# Patient Record
Sex: MALE | Race: WHITE | NOT HISPANIC OR LATINO | Employment: STUDENT | URBAN - METROPOLITAN AREA
[De-identification: names, ages, dates, MRNs, and addresses within clinical notes are randomized per-mention and may not be internally consistent; named-entity substitution may affect disease eponyms.]

---

## 2018-09-10 ENCOUNTER — OFFICE VISIT (OUTPATIENT)
Dept: CARDIOLOGY | Facility: CLINIC | Age: 28
End: 2018-09-10
Payer: COMMERCIAL

## 2018-09-10 VITALS
BODY MASS INDEX: 38.82 KG/M2 | SYSTOLIC BLOOD PRESSURE: 118 MMHG | HEIGHT: 74 IN | DIASTOLIC BLOOD PRESSURE: 80 MMHG | HEART RATE: 70 BPM | WEIGHT: 302.5 LBS

## 2018-09-10 DIAGNOSIS — R00.2 HEART PALPITATIONS: Primary | ICD-10-CM

## 2018-09-10 DIAGNOSIS — F31.60 BIPOLAR AFFECTIVE DISORDER, MIXED: ICD-10-CM

## 2018-09-10 DIAGNOSIS — F06.4 ANXIETY DISORDER DUE TO GENERAL MEDICAL CONDITION: ICD-10-CM

## 2018-09-10 PROCEDURE — 99204 OFFICE O/P NEW MOD 45 MIN: CPT | Mod: S$GLB,,, | Performed by: INTERNAL MEDICINE

## 2018-09-10 PROCEDURE — 99999 PR PBB SHADOW E&M-NEW PATIENT-LVL III: CPT | Mod: PBBFAC,,, | Performed by: INTERNAL MEDICINE

## 2018-09-10 PROCEDURE — 3008F BODY MASS INDEX DOCD: CPT | Mod: CPTII,S$GLB,, | Performed by: INTERNAL MEDICINE

## 2018-09-10 PROCEDURE — 93005 ELECTROCARDIOGRAM TRACING: CPT | Mod: S$GLB,,, | Performed by: INTERNAL MEDICINE

## 2018-09-10 PROCEDURE — 93010 ELECTROCARDIOGRAM REPORT: CPT | Mod: S$GLB,,, | Performed by: INTERNAL MEDICINE

## 2018-09-10 RX ORDER — LAMOTRIGINE 200 MG/1
200 TABLET ORAL DAILY
COMMUNITY

## 2018-09-10 NOTE — PROGRESS NOTES
Subjective:   Patient ID:  Oli Bettencourt is a 28 y.o. male who presents for evaulation of Chest Pain and Palpitations      HPI: Patient with bipolar disorder and general anxiety, presents with heart palpitations and chest pain while exercising.  He has no rest symptoms. These symptoms only occur when he runs and resolve as soon as he stops. His anxiety dates back to college times when he smoked and used drugs and alcohol. 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    He was recently diagnosed with IBS and gi reflux and he was told that his chest discomfort is due to excess of gas.    He has no other symptoms and is doing well.    ECG-normal    Past Medical History:   Diagnosis Date    Anxiety        History reviewed. No pertinent surgical history.    Social History     Socioeconomic History    Marital status: Single     Spouse name: None    Number of children: None    Years of education: None    Highest education level: None   Social Needs    Financial resource strain: None    Food insecurity - worry: None    Food insecurity - inability: None    Transportation needs - medical: None    Transportation needs - non-medical: None   Occupational History    None   Tobacco Use    Smoking status: Former Smoker     Last attempt to quit: 2006     Years since quittin.6    Smokeless tobacco: Never Used   Substance and Sexual Activity    Alcohol use: No    Drug use: None    Sexual activity: None   Other Topics Concern    None   Social History Narrative    None       Review of patient's allergies indicates:  No Known Allergies    No results found for: NA, K, CL, CO2, BUN, CREATININE, GLU, HGBA1C, MG, AST, ALT, ALBUMIN, PROT, BILITOT, WBC, HGB, HCT, MCV, PLT, INR, PSA, TSH, CHOL, HDL, LDLCALC, TRIG    Review of Systems   Constitution: Positive for malaise/fatigue.   HENT: Negative.    Eyes: Negative.    Cardiovascular: Positive for palpitations. Negative for chest pain, claudication,  "dyspnea on exertion, irregular heartbeat, leg swelling, near-syncope and syncope.   Respiratory: Negative.  Negative for cough, shortness of breath, snoring and wheezing.    Endocrine: Negative.  Negative for cold intolerance, heat intolerance, polydipsia, polyphagia and polyuria.   Skin: Negative.    Musculoskeletal: Negative.    Gastrointestinal: Negative.    Genitourinary: Negative.    Neurological: Positive for numbness and paresthesias.   Psychiatric/Behavioral: Positive for depression. The patient is nervous/anxious.        Objective:   Physical Exam   Constitutional: He is oriented to person, place, and time. He appears well-developed and well-nourished.   /80   Pulse 70   Ht 6' 2" (1.88 m)   Wt (!) 137.2 kg (302 lb 7.5 oz)   BMI 38.84 kg/m²      HENT:   Head: Normocephalic.   Eyes: Pupils are equal, round, and reactive to light.   Neck: Normal range of motion. Neck supple. Carotid bruit is not present. No thyromegaly present.   Cardiovascular: Normal rate, regular rhythm, normal heart sounds and intact distal pulses. Exam reveals no gallop and no friction rub.   No murmur heard.  Pulses:       Carotid pulses are 2+ on the right side, and 2+ on the left side.       Radial pulses are 2+ on the right side, and 2+ on the left side.        Femoral pulses are 2+ on the right side, and 2+ on the left side.       Popliteal pulses are 2+ on the right side, and 2+ on the left side.        Dorsalis pedis pulses are 2+ on the right side, and 2+ on the left side.        Posterior tibial pulses are 2+ on the right side, and 2+ on the left side.   Pulmonary/Chest: Effort normal and breath sounds normal. No respiratory distress. He has no wheezes. He has no rales. He exhibits no tenderness.   Abdominal: Soft. Bowel sounds are normal.   obese   Musculoskeletal: Normal range of motion. He exhibits no edema.   Neurological: He is alert and oriented to person, place, and time.   Skin: Skin is warm and dry. "   Psychiatric: He has a normal mood and affect.   Nursing note and vitals reviewed.      Current Outpatient Medications   Medication Sig    lamoTRIgine (LAMICTAL) 200 MG tablet Take 200 mg by mouth once daily.     No current facility-administered medications for this visit.        Assessment and Plan:     Problem List Items Addressed This Visit     Anxiety disorder due to general medical condition    Bipolar affective disorder, mixed    Heart palpitations - Primary    Relevant Orders    IN OFFICE EKG 12-LEAD (to Muse)    2D echo with color flow doppler             Medication List           Accurate as of 9/10/18  1:10 PM. If you have any questions, ask your nurse or doctor.               CONTINUE taking these medications    lamoTRIgine 200 MG tablet  Commonly known as:  LAMICTAL        STOP taking these medications    escitalopram oxalate 10 MG tablet  Commonly known as:  LEXAPRO  Stopped by:  Abigail Schultz MD        Symptoms are atypical and probably related to anxiety.  Advised to continue to exercise and keep balanced diet, loose weight.  Review results of CFD and advise. IF normal follow up with PCP.    If symptoms persist may  need stress echo    No Follow-up on file.

## 2018-09-11 ENCOUNTER — CLINICAL SUPPORT (OUTPATIENT)
Dept: CARDIOLOGY | Facility: CLINIC | Age: 28
End: 2018-09-11
Attending: INTERNAL MEDICINE
Payer: COMMERCIAL

## 2018-09-11 DIAGNOSIS — R00.2 HEART PALPITATIONS: ICD-10-CM

## 2018-09-11 LAB
DIASTOLIC DYSFUNCTION: NO
ESTIMATED PA SYSTOLIC PRESSURE: 21.84
RETIRED EF AND QEF - SEE NOTES: 60 (ref 55–65)

## 2018-09-11 PROCEDURE — 93306 TTE W/DOPPLER COMPLETE: CPT | Mod: S$GLB,,, | Performed by: INTERNAL MEDICINE

## 2018-09-12 ENCOUNTER — TELEPHONE (OUTPATIENT)
Dept: CARDIOLOGY | Facility: CLINIC | Age: 28
End: 2018-09-12

## 2018-09-12 NOTE — TELEPHONE ENCOUNTER
----- Message from Abigail Schultz MD sent at 9/12/2018  1:45 PM CDT -----  Mr. Bettencourt,  Please review results of your heart echo. IT was normal. Great news. We do not need to do any other cardiac tests at this time.

## 2021-04-01 ENCOUNTER — IMMUNIZATION (OUTPATIENT)
Dept: PRIMARY CARE CLINIC | Facility: CLINIC | Age: 31
End: 2021-04-01
Payer: COMMERCIAL

## 2021-04-01 DIAGNOSIS — Z23 NEED FOR VACCINATION: Primary | ICD-10-CM

## 2021-04-01 PROCEDURE — 0011A COVID-19, MRNA, LNP-S, PF, 100 MCG/0.5 ML DOSE VACCINE: CPT | Mod: PBBFAC | Performed by: EMERGENCY MEDICINE

## 2021-05-05 ENCOUNTER — IMMUNIZATION (OUTPATIENT)
Dept: PRIMARY CARE CLINIC | Facility: CLINIC | Age: 31
End: 2021-05-05
Payer: COMMERCIAL

## 2021-05-05 DIAGNOSIS — Z23 NEED FOR VACCINATION: Primary | ICD-10-CM

## 2023-10-30 ENCOUNTER — OFFICE VISIT (OUTPATIENT)
Dept: GASTROENTEROLOGY | Facility: CLINIC | Age: 33
End: 2023-10-30
Payer: COMMERCIAL

## 2023-10-30 VITALS — BODY MASS INDEX: 38.95 KG/M2 | WEIGHT: 293.88 LBS | HEIGHT: 73 IN

## 2023-10-30 DIAGNOSIS — K21.9 GASTROESOPHAGEAL REFLUX DISEASE, UNSPECIFIED WHETHER ESOPHAGITIS PRESENT: ICD-10-CM

## 2023-10-30 DIAGNOSIS — Z86.010 HISTORY OF COLONIC POLYPS: Primary | ICD-10-CM

## 2023-10-30 PROCEDURE — 3008F PR BODY MASS INDEX (BMI) DOCUMENTED: ICD-10-PCS | Mod: CPTII,S$GLB,, | Performed by: INTERNAL MEDICINE

## 2023-10-30 PROCEDURE — 99999 PR PBB SHADOW E&M-EST. PATIENT-LVL III: ICD-10-PCS | Mod: PBBFAC,,, | Performed by: INTERNAL MEDICINE

## 2023-10-30 PROCEDURE — 99999 PR PBB SHADOW E&M-EST. PATIENT-LVL III: CPT | Mod: PBBFAC,,, | Performed by: INTERNAL MEDICINE

## 2023-10-30 PROCEDURE — 99203 OFFICE O/P NEW LOW 30 MIN: CPT | Mod: S$GLB,,, | Performed by: INTERNAL MEDICINE

## 2023-10-30 PROCEDURE — 1160F RVW MEDS BY RX/DR IN RCRD: CPT | Mod: CPTII,S$GLB,, | Performed by: INTERNAL MEDICINE

## 2023-10-30 PROCEDURE — 99203 PR OFFICE/OUTPT VISIT, NEW, LEVL III, 30-44 MIN: ICD-10-PCS | Mod: S$GLB,,, | Performed by: INTERNAL MEDICINE

## 2023-10-30 PROCEDURE — 3008F BODY MASS INDEX DOCD: CPT | Mod: CPTII,S$GLB,, | Performed by: INTERNAL MEDICINE

## 2023-10-30 PROCEDURE — 1159F MED LIST DOCD IN RCRD: CPT | Mod: CPTII,S$GLB,, | Performed by: INTERNAL MEDICINE

## 2023-10-30 PROCEDURE — 1160F PR REVIEW ALL MEDS BY PRESCRIBER/CLIN PHARMACIST DOCUMENTED: ICD-10-PCS | Mod: CPTII,S$GLB,, | Performed by: INTERNAL MEDICINE

## 2023-10-30 PROCEDURE — 1159F PR MEDICATION LIST DOCUMENTED IN MEDICAL RECORD: ICD-10-PCS | Mod: CPTII,S$GLB,, | Performed by: INTERNAL MEDICINE

## 2023-10-30 RX ORDER — BUSPIRONE HYDROCHLORIDE 15 MG/1
7.5 TABLET ORAL 2 TIMES DAILY
COMMUNITY
Start: 2023-08-02

## 2023-11-09 NOTE — PROGRESS NOTES
Subjective     Patient ID: Oli Bettencourt is a 33 y.o. male.    Chief Complaint: Establish Care (History of IBS per patient)    This is my 1st encounter with this pleasant 33-year-old male, who is here to establish in our clinic because of a history of colon polyps.      Review of Systems   Constitutional:  Negative for activity change, appetite change, fatigue, fever and unexpected weight change.   HENT: Negative.  Negative for dental problem, drooling, mouth sores, sore throat, trouble swallowing and voice change.    Eyes: Negative.    Respiratory:  Negative for cough, choking, shortness of breath, wheezing and stridor.    Cardiovascular:  Negative for chest pain.   Gastrointestinal:  Positive for blood in stool (Occasional rectal bleeding.  History of hemorrhoids.). Negative for abdominal distention, abdominal pain, anal bleeding, constipation, diarrhea, nausea, rectal pain and vomiting.   Genitourinary: Negative.    Musculoskeletal:  Negative for arthralgias, joint swelling, myalgias and neck stiffness.   Integumentary:  Negative for color change and rash.   Neurological:  Negative for weakness.   Hematological:  Negative for adenopathy. Does not bruise/bleed easily.   Psychiatric/Behavioral:  Negative for agitation, behavioral problems, confusion, decreased concentration and dysphoric mood.           Objective     Physical Exam  Constitutional:       General: He is not in acute distress.     Appearance: Normal appearance. He is well-developed. He is obese.   HENT:      Head: Normocephalic.      Nose: Nose normal.      Mouth/Throat:      Pharynx: No oropharyngeal exudate.   Eyes:      General: No scleral icterus.     Conjunctiva/sclera: Conjunctivae normal.   Neck:      Thyroid: No thyromegaly.      Trachea: No tracheal deviation.   Cardiovascular:      Rate and Rhythm: Normal rate and regular rhythm.      Heart sounds: Normal heart sounds. No murmur heard.     No gallop.   Pulmonary:      Effort: Pulmonary effort  is normal.      Breath sounds: Normal breath sounds. No wheezing or rales.   Abdominal:      General: Abdomen is flat. Bowel sounds are normal. There is no distension.      Palpations: Abdomen is soft. There is no mass.      Tenderness: There is no abdominal tenderness. There is no guarding.   Genitourinary:     Rectum: Guaiac result negative.   Musculoskeletal:         General: Normal range of motion.      Cervical back: Neck supple.   Lymphadenopathy:      Cervical: No cervical adenopathy.   Skin:     General: Skin is warm and dry.      Findings: No erythema or rash.   Neurological:      General: No focal deficit present.      Mental Status: He is alert and oriented to person, place, and time.   Psychiatric:         Mood and Affect: Mood normal.         Behavior: Behavior normal.            Assessment and Plan     History of colonic polyps    Gastroesophageal reflux disease, unspecified whether esophagitis present    BMI 38.0-38.9,adult      Schedule for colonoscopy.  [facility] to get [facility] from [facility].         No follow-ups on file.

## 2023-12-08 ENCOUNTER — TELEPHONE (OUTPATIENT)
Dept: GASTROENTEROLOGY | Facility: CLINIC | Age: 33
End: 2023-12-08
Payer: COMMERCIAL

## 2023-12-08 NOTE — TELEPHONE ENCOUNTER
----- Message from Sona Daniels sent at 12/8/2023  3:43 PM CST -----  Type: Sooner Apoointment Request  Caller is requesting a sooner appointment. Caller declined first available   appointment listed below. Caller will not accept being placed on the waitlist and   is requesting a message be sent to doctor.  Name of Caller:TAMMY XIONG [[id number]]  When is the first available appointment?12/29/23  Symptoms:COLONOSCOPY  Would the patient rather a call back or a response via [facility]? CALL BACK   Best Call Back Number: [phone]  Additional Information: PT  indicates he is schedule for a procedure 12/29. Pt indicates he would like to know if any open appt are available for 12/14/23 or 12/24/23. Please call back with further assistance and more information.

## 2023-12-13 ENCOUNTER — TELEPHONE (OUTPATIENT)
Dept: GASTROENTEROLOGY | Facility: CLINIC | Age: 33
End: 2023-12-13
Payer: COMMERCIAL

## 2023-12-13 NOTE — TELEPHONE ENCOUNTER
----- Message from Margaret Lima sent at 12/13/2023 11:21 AM CST -----  Type:  Reschedule Appointment Request    Caller is requesting to reschedule appointment.      Name of Caller:  Pt    When is the first available appointment?  Has procedure 12/29    Symptoms:  colonoscopy    Would the patient rather a call back or a response via [facility]?  Call back    Best Call Back Number:  [phone]    Additional Information:  Pt needs to see if there is any open Thursday in the future to have this procedure done.   Please call back to advise. Thanks!

## 2023-12-13 NOTE — TELEPHONE ENCOUNTER
----- Message from Haylee Vasquez sent at 12/13/2023 11:26 AM CST -----  Type:  Patient Returning Call    Who Called:pt     Who Left Message for Patient:Jesus Janie    Does the patient know what this is regarding?:yes     Would the patient rather a call back or a response via [facility]? Callback     Best Call Back Number:[phone] (home)       Additional Information:  please advise thank you

## 2023-12-13 NOTE — TELEPHONE ENCOUNTER
Called and spoke with the patient, patient wanted to move his procedure date so that he will have transportation, procedure rescheduled with the patient.

## 2024-02-22 ENCOUNTER — TELEPHONE (OUTPATIENT)
Dept: GASTROENTEROLOGY | Facility: CLINIC | Age: 34
End: 2024-02-22
Payer: COMMERCIAL

## 2024-02-22 NOTE — TELEPHONE ENCOUNTER
Prep Information sent to patient portal        Magnesium Citrate Prep     ALERT: Please notify the clinic if you start any blood thinners as does affect your procedure     NOTE:  -No ASPIRIN or ibuprofen products the morning of your procedure.  This includes Motrin, ALEVE, Advil, or other arthritis type medications. Tylenol is allowed.  -AVOID the following foods for 7-10 days prior to procedure: beans, peas, corn, nuts, popcorn, or tomatoes. If you forget we will not cancel your procedure.    You will need:  -2 ten (10) ounce bottles Magnesium Citrate (clear only)   -4 Dulcolax laxative tablets (any brand)    ONE DAY BEFORE YOUR PROCEDURE:  Begin the clear liquid diet the entire day before your procedure  At 10 am, take 2 Dulcolax tablets   At 2 pm, take 2 Dulcolax tablets  AT 5:00 PM, you will drink your first bottle of Magnesium Citrate. It will taste better if refrigerated (directions on bottle state do not refrigerate, this is okay for THIS occasion)    -It is important that you flush your system with at least Five - 8 ounce glasses of clear liquids by 8 PM.  At 9:00 PM, you will drink your second bottle of Magnesium Citrate    -Flush your system with at least THREE - 8 ounce glasses of water by midnight.    CLEAR LIQUIDS INCLUDE: Coffee (no cream), tea, apple juice, white grape juice, limit carbonated drinks to 2 in 24 hours, bullion, chicken broth, beef broth, Gatorade, Tello-Aid, jello, popsicles, snowballs, Italian ice, and hard candy. NO ALCOHOL. NOTHING RED OR PURPLE.     A preop nurse will call the day prior to your procedure to discuss medications you can and cannot take the morning of your procedure. Since sedation is used, you must have someone drive you home. It is [facility] policy that you may not take a taxi home after sedation.    Please let us know if you have any questions after reading these instructions.     Thank you for allowing us to participate in your healthcare needs.     Respectfully,              NOTE:  ·         Dulaglutide (Trulicity) (weekly) - hold 8 days  ·         Exenatide extended release (Bydureon bcise) (weekly) - hold 8 days  ·         Semaglutide (Ozempic) (weekly) - hold 8 days  Tirepatide (Mounjaro) (weekly) - hold 8 days  Wegovy (weekly) - hold 8 days  ·         Exenatide (Byetta) (twice daily)- hold DAY OF PROCEDURE  ·         Liraglutide (Victoza, Saxenda) (daily)- hold DAY OF PROCEDURE  ·         Lixisenatide (Adlyxin) (daily)- hold DAY OF PROCEDURE  ·         Semaglutide (Rybelsus) (daily) -hold DAY OF PROCEDURE

## 2024-05-13 ENCOUNTER — TELEPHONE (OUTPATIENT)
Dept: GASTROENTEROLOGY | Facility: CLINIC | Age: 34
End: 2024-05-13
Payer: COMMERCIAL

## 2024-05-13 NOTE — TELEPHONE ENCOUNTER
Called and spoke with the patient, patient's procedure has been changed from Dr. Claros to Dr. Carr and left on the same scheduled date, patient verbalized understanding of this and agreed to this as well.

## 2024-05-16 ENCOUNTER — ANESTHESIA EVENT (OUTPATIENT)
Dept: ENDOSCOPY | Facility: HOSPITAL | Age: 34
End: 2024-05-16
Payer: COMMERCIAL

## 2024-05-16 ENCOUNTER — ANESTHESIA (OUTPATIENT)
Dept: ENDOSCOPY | Facility: HOSPITAL | Age: 34
End: 2024-05-16
Payer: COMMERCIAL

## 2024-05-16 ENCOUNTER — HOSPITAL ENCOUNTER (OUTPATIENT)
Facility: HOSPITAL | Age: 34
Discharge: HOME | End: 2024-05-16
Attending: SURGERY | Admitting: SURGERY
Payer: COMMERCIAL

## 2024-05-16 DIAGNOSIS — Z86.010 HISTORY OF COLON POLYPS: ICD-10-CM

## 2024-05-16 PROCEDURE — 37000008 HC ANESTHESIA 1ST 15 MINUTES: Mod: PO | Performed by: SURGERY

## 2024-05-16 PROCEDURE — 88305 TISSUE EXAM BY PATHOLOGIST: CPT | Mod: 26,,, | Performed by: PATHOLOGY

## 2024-05-16 PROCEDURE — D9220A PRA ANESTHESIA: Mod: 33,CRNA,, | Performed by: NURSE ANESTHETIST, CERTIFIED REGISTERED

## 2024-05-16 PROCEDURE — 63600175 PHARM REV CODE 636 W HCPCS: Mod: PO | Performed by: NURSE ANESTHETIST, CERTIFIED REGISTERED

## 2024-05-16 PROCEDURE — 63600175 PHARM REV CODE 636 W HCPCS: Mod: PO | Performed by: SURGERY

## 2024-05-16 PROCEDURE — D9220A PRA ANESTHESIA: Mod: 33,ANES,, | Performed by: ANESTHESIOLOGY

## 2024-05-16 PROCEDURE — 45380 COLONOSCOPY AND BIOPSY: CPT | Mod: 33,,, | Performed by: SURGERY

## 2024-05-16 PROCEDURE — 37000009 HC ANESTHESIA EA ADD 15 MINS: Mod: PO | Performed by: SURGERY

## 2024-05-16 PROCEDURE — 45380 COLONOSCOPY AND BIOPSY: CPT | Mod: PT,PO | Performed by: SURGERY

## 2024-05-16 PROCEDURE — 27201012 HC FORCEPS, HOT/COLD, DISP: Mod: PO | Performed by: SURGERY

## 2024-05-16 PROCEDURE — 25000003 PHARM REV CODE 250: Mod: PO | Performed by: NURSE ANESTHETIST, CERTIFIED REGISTERED

## 2024-05-16 PROCEDURE — 88305 TISSUE EXAM BY PATHOLOGIST: CPT | Mod: PO | Performed by: PATHOLOGY

## 2024-05-16 RX ORDER — SODIUM CHLORIDE, SODIUM LACTATE, POTASSIUM CHLORIDE, CALCIUM CHLORIDE 600; 310; 30; 20 MG/100ML; MG/100ML; MG/100ML; MG/100ML
INJECTION, SOLUTION INTRAVENOUS CONTINUOUS
Status: DISCONTINUED | OUTPATIENT
Start: 2024-05-16 | End: 2024-05-16 | Stop reason: HOSPADM

## 2024-05-16 RX ORDER — PROPOFOL 10 MG/ML
VIAL (ML) INTRAVENOUS
Status: DISCONTINUED | OUTPATIENT
Start: 2024-05-16 | End: 2024-05-16

## 2024-05-16 RX ORDER — PROPOFOL 10 MG/ML
VIAL (ML) INTRAVENOUS CONTINUOUS PRN
Status: DISCONTINUED | OUTPATIENT
Start: 2024-05-16 | End: 2024-05-16

## 2024-05-16 RX ORDER — LIDOCAINE HYDROCHLORIDE 20 MG/ML
INJECTION INTRAVENOUS
Status: DISCONTINUED | OUTPATIENT
Start: 2024-05-16 | End: 2024-05-16

## 2024-05-16 RX ADMIN — PROPOFOL 150 MCG/KG/MIN: 10 INJECTION, EMULSION INTRAVENOUS at 11:05

## 2024-05-16 RX ADMIN — SODIUM CHLORIDE, POTASSIUM CHLORIDE, SODIUM LACTATE AND CALCIUM CHLORIDE: 600; 310; 30; 20 INJECTION, SOLUTION INTRAVENOUS at 10:05

## 2024-05-16 RX ADMIN — PROPOFOL 130 MG: 10 INJECTION, EMULSION INTRAVENOUS at 11:05

## 2024-05-16 RX ADMIN — LIDOCAINE HYDROCHLORIDE 100 MG: 20 INJECTION INTRAVENOUS at 11:05

## 2024-05-16 NOTE — H&P
[facility] - Endoscopy  History & Physical     Subjective:     Chief Complaint/Reason for Admission: history of colon polyps    History of Present Illness:   Patient 33 y.o. male presentsfor colonoscopy.  Pt notes that he has had polyps in the past .  He denies abd pain or changes in bowel haibts. He has no significant PMhx or pSHx.       Patient Active Problem List    Diagnosis Date Noted    Anxiety disorder due to general medical condition 09/10/2018    Bipolar affective disorder, mixed 09/10/2018    Heart palpitations 09/10/2018        Medications Prior to Admission   Medication Sig Dispense Refill Last Dose    busPIRone (BUSPAR) 15 MG tablet Take 7.5 mg by mouth 2 (two) times daily.   5/15/2024    lamoTRIgine (LAMICTAL) 200 MG tablet Take 200 mg by mouth once daily.   5/15/2024     Review of patient's allergies indicates:  No Known Allergies     Past Medical History:   Diagnosis Date    Anxiety       History reviewed. No pertinent surgical history.     Social History     Tobacco Use    Smoking status: Former     Current packs/day: 0.00     Types: Cigarettes     Quit date: 2006     Years since quittin.3    Smokeless tobacco: Never   Substance Use Topics    Alcohol use: No        Review of Systems:  A comprehensive review of systems was negative.    OBJECTIVE:     Patient Vitals for the past 8 hrs:   BP Temp Temp src Pulse Resp SpO2 Weight   24 1013 113/71 97.9 °F (36.6 °C) Skin 74 17 99 % 127 kg (280 lb)     AAox3  Soft/nd/nt  No resp distress    Data Review:      ASSESSMENT/PLAN:     There are no hospital problems to display for this patient.  Surveillance  cscope    Plan:  To have cscope hossein

## 2024-05-16 NOTE — TRANSFER OF CARE
Anesthesia Transfer of Care Note    Patient: Oli Bettencourt    Procedure(s) Performed: Procedure(s) (LRB):  COLONOSCOPY (N/A)    Patient location: PACU    Anesthesia Type: general    Transport from OR: Transported from OR on room air with adequate spontaneous ventilation    Post pain: adequate analgesia    Post assessment: no apparent anesthetic complications and tolerated procedure well    Post vital signs: stable    Level of consciousness: lethargic and responds to stimulation    Nausea/Vomiting: no nausea/vomiting    Complications: none    Transfer of care protocol was followed      Last vitals: Visit Vitals  /71 (BP Location: Right arm, Patient Position: Sitting)   Pulse 74   Temp 36.6 °C (97.9 °F) (Skin)   Resp 17   Wt 127 kg (280 lb)   SpO2 99%   BMI 36.94 kg/m²

## 2024-05-16 NOTE — PROVATION PATIENT INSTRUCTIONS
Discharge Summary/Instructions after an Endoscopic Procedure  Patient Name: Oli Bettencourt  Patient MRN: [id number]  Patient YOB: 1990     Thursday, May 16, 2024  Eben Carr MD  Dear patient,  As a result of recent federal legislation (The Federal Cures Act), you may   receive lab or pathology results from your procedure in your [facility]   account before your physician is able to contact you. Your physician or   their representative will relay the results to you with their   recommendations at their soonest availability.  Thank you,  RESTRICTIONS:  During your procedure today, you received medications for sedation.  These   medications may affect your judgment, balance and coordination.  Therefore,   for 24 hours, you have the following restrictions:   - DO NOT drive a car, operate machinery, make legal/financial decisions,   sign important papers or drink alcohol.    ACTIVITY:  Today: no heavy lifting, straining or running due to procedural   sedation/anesthesia.  The following day: return to full activity including work.  DIET:  Eat and drink normally unless instructed otherwise.     TREATMENT FOR COMMON SIDE EFFECTS:  - Mild abdominal pain, nausea, belching, bloating or excessive gas:  rest,   eat lightly and use a heating pad.  - Sore Throat: treat with throat lozenges and/or gargle with warm salt   water.  - Because air was used during the procedure, expelling large amounts of air   from your rectum or belching is normal.  - If a bowel prep was taken, you may not have a bowel movement for 1-3 days.    This is normal.  SYMPTOMS TO WATCH FOR AND REPORT TO YOUR PHYSICIAN:  1. Abdominal pain or bloating, other than gas cramps.  2. Chest pain.  3. Back pain.  4. Signs of infection such as: chills or fever occurring within 24 hours   after the procedure.  5. Rectal bleeding, which would show as bright red, maroon, or black stools.   (A tablespoon of blood from the rectum is not serious, especially if    hemorrhoids are present.)  6. Vomiting.  7. Weakness or dizziness.  GO DIRECTLY TO THE NEAREST EMERGENCY ROOM IF YOU HAVE ANY OF THE FOLLOWING:      Difficulty breathing              Chills and/or fever over 101 F   Persistent vomiting and/or vomiting blood   Severe abdominal pain   Severe chest pain   Black, tarry stools   Bleeding- more than one tablespoon   Any other symptom or condition that you feel may need urgent attention  Your doctor recommends these additional instructions:  If any biopsies were taken, your doctors clinic will contact you in 1 to 2   weeks with any results.  You are being discharged to home.   Resume your regular diet.   Continue your present medications.   We are waiting for your pathology results.   Your physician has recommended a repeat colonoscopy in five years for   surveillance.   Return to my office as needed.  For questions, problems or results please call your physician - Eben Carr MD at Work:  [phone].  EMERGENCY PHONE NUMBER: [phone], LAB RESULTS: [phone]  IF A COMPLICATION OR EMERGENCY SITUATION ARISES AND YOU ARE UNABLE TO REACH   YOUR PHYSICIAN - GO DIRECTLY TO THE EMERGENCY ROOM.  ___________________________________________  Nurse Signature  ___________________________________________  Patient/Designated Responsible Party Signature  Eben Carr MD  5/16/2024 11:20:12 AM  This report has been verified and signed electronically.  Dear patient,  As a result of recent federal legislation (The Federal Cures Act), you may   receive lab or pathology results from your procedure in your [facility]   account before your physician is able to contact you. Your physician or   their representative will relay the results to you with their   recommendations at their soonest availability.  Thank you.  PROVATION

## 2024-05-16 NOTE — ANESTHESIA PREPROCEDURE EVALUATION
05/16/2024  Oli Bettencourt is a 33 y.o., male.      Pre-op Assessment          Review of Systems  Psych:  Psychiatric History                  Physical Exam  General: Well nourished        Anesthesia Plan  Type of Anesthesia, risks & benefits discussed:    Anesthesia Type: Gen Natural Airway  Intra-op Monitoring Plan: Standard ASA Monitors  Induction:  IV  Informed Consent: Informed consent signed with the Patient and all parties understand the risks and agree with anesthesia plan.  All questions answered.   ASA Score: 1  Day of Surgery Review of History & Physical: H&P Update referred to the surgeon/provider.    Ready For Surgery From Anesthesia Perspective.     .

## 2024-05-17 VITALS
TEMPERATURE: 98 F | DIASTOLIC BLOOD PRESSURE: 72 MMHG | RESPIRATION RATE: 18 BRPM | BODY MASS INDEX: 36.94 KG/M2 | SYSTOLIC BLOOD PRESSURE: 114 MMHG | OXYGEN SATURATION: 99 % | HEART RATE: 79 BPM | WEIGHT: 280 LBS

## 2024-05-20 NOTE — ANESTHESIA POSTPROCEDURE EVALUATION
Anesthesia Post Evaluation    Patient: Oli Bettencourt    Procedure(s) Performed: Procedure(s) (LRB):  COLONOSCOPY (N/A)    Final Anesthesia Type: general      Patient location during evaluation: PACU  Patient participation: Yes- Able to Participate  Level of consciousness: awake and alert  Post-procedure vital signs: reviewed and stable  Pain management: adequate  Airway patency: patent    PONV status at discharge: No PONV  Anesthetic complications: no      Cardiovascular status: blood pressure returned to baseline  Respiratory status: unassisted and room air  Hydration status: euvolemic  Follow-up not needed.              Vitals Value Taken Time   /72 05/16/24 1132   Temp 36.6 °C (97.8 °F) 05/16/24 1119   Pulse 79 05/16/24 1132   Resp 18 05/16/24 1132   SpO2 99 % 05/16/24 1132         No case tracking events are documented in the log.      Pain/Kristal Score: No data recorded

## 2024-05-22 LAB
FINAL PATHOLOGIC DIAGNOSIS: NORMAL
GROSS: NORMAL
Lab: NORMAL

## 2024-05-29 ENCOUNTER — TELEPHONE (OUTPATIENT)
Dept: SURGERY | Facility: CLINIC | Age: 34
End: 2024-05-29
Payer: COMMERCIAL

## 2024-05-29 NOTE — TELEPHONE ENCOUNTER
Called and attempted to reviewed pathology with pt.          TRANSVERSE COLON, POLYPECTOMY:  Benign colonic mucosa with focal features of fibroid polyp  (deeper levels have also been evaluated)         Pt did not answer  Recommend repeat cscope in 5 years given history of colon polyps    WIll have office reach out to pt

## 2024-11-26 ENCOUNTER — PATIENT MESSAGE (OUTPATIENT)
Dept: SURGERY | Facility: CLINIC | Age: 34
End: 2024-11-26
Payer: COMMERCIAL